# Patient Record
Sex: MALE | Race: WHITE | NOT HISPANIC OR LATINO | Employment: FULL TIME | URBAN - METROPOLITAN AREA
[De-identification: names, ages, dates, MRNs, and addresses within clinical notes are randomized per-mention and may not be internally consistent; named-entity substitution may affect disease eponyms.]

---

## 2020-01-15 DIAGNOSIS — Z01.810 PRE-OPERATIVE CARDIOVASCULAR EXAMINATION: ICD-10-CM

## 2020-01-15 PROCEDURE — 93010 ELECTROCARDIOGRAM REPORT: CPT | Performed by: INTERNAL MEDICINE

## 2020-01-15 PROCEDURE — 93005 ELECTROCARDIOGRAM TRACING: CPT

## 2020-01-15 RX ORDER — CITALOPRAM 40 MG/1
40 TABLET ORAL DAILY
COMMUNITY

## 2020-01-15 RX ORDER — ATORVASTATIN CALCIUM 40 MG/1
40 TABLET, FILM COATED ORAL DAILY
COMMUNITY

## 2020-01-16 ENCOUNTER — ANESTHESIA (OUTPATIENT)
Dept: SURGERY | Facility: MEDICAL CENTER | Age: 65
End: 2020-01-16
Payer: COMMERCIAL

## 2020-01-16 ENCOUNTER — ANESTHESIA EVENT (OUTPATIENT)
Dept: SURGERY | Facility: MEDICAL CENTER | Age: 65
End: 2020-01-16
Payer: COMMERCIAL

## 2020-01-16 ENCOUNTER — HOSPITAL ENCOUNTER (OUTPATIENT)
Facility: MEDICAL CENTER | Age: 65
End: 2020-01-16
Attending: SPECIALIST | Admitting: SPECIALIST
Payer: COMMERCIAL

## 2020-01-16 VITALS
WEIGHT: 145.5 LBS | HEIGHT: 68 IN | SYSTOLIC BLOOD PRESSURE: 146 MMHG | TEMPERATURE: 97 F | RESPIRATION RATE: 14 BRPM | OXYGEN SATURATION: 95 % | DIASTOLIC BLOOD PRESSURE: 85 MMHG | HEART RATE: 85 BPM | BODY MASS INDEX: 22.05 KG/M2

## 2020-01-16 LAB — EKG IMPRESSION: NORMAL

## 2020-01-16 PROCEDURE — C1713 ANCHOR/SCREW BN/BN,TIS/BN: HCPCS | Performed by: SPECIALIST

## 2020-01-16 PROCEDURE — 700111 HCHG RX REV CODE 636 W/ 250 OVERRIDE (IP): Performed by: ANESTHESIOLOGY

## 2020-01-16 PROCEDURE — A9270 NON-COVERED ITEM OR SERVICE: HCPCS | Performed by: ANESTHESIOLOGY

## 2020-01-16 PROCEDURE — 160025 RECOVERY II MINUTES (STATS): Performed by: SPECIALIST

## 2020-01-16 PROCEDURE — 160009 HCHG ANES TIME/MIN: Performed by: SPECIALIST

## 2020-01-16 PROCEDURE — 700102 HCHG RX REV CODE 250 W/ 637 OVERRIDE(OP): Performed by: ANESTHESIOLOGY

## 2020-01-16 PROCEDURE — 700101 HCHG RX REV CODE 250: Performed by: ANESTHESIOLOGY

## 2020-01-16 PROCEDURE — 501838 HCHG SUTURE GENERAL: Performed by: SPECIALIST

## 2020-01-16 PROCEDURE — 160036 HCHG PACU - EA ADDL 30 MINS PHASE I: Performed by: SPECIALIST

## 2020-01-16 PROCEDURE — 160002 HCHG RECOVERY MINUTES (STAT): Performed by: SPECIALIST

## 2020-01-16 PROCEDURE — A6223 GAUZE >16<=48 NO W/SAL W/O B: HCPCS | Performed by: SPECIALIST

## 2020-01-16 PROCEDURE — 700105 HCHG RX REV CODE 258: Performed by: SPECIALIST

## 2020-01-16 PROCEDURE — 700111 HCHG RX REV CODE 636 W/ 250 OVERRIDE (IP): Performed by: SPECIALIST

## 2020-01-16 PROCEDURE — 501445 HCHG STAPLER, SKIN DISP: Performed by: SPECIALIST

## 2020-01-16 PROCEDURE — 500331 HCHG COTTONOID, SURG PATTIE: Performed by: SPECIALIST

## 2020-01-16 PROCEDURE — 160046 HCHG PACU - 1ST 60 MINS PHASE II: Performed by: SPECIALIST

## 2020-01-16 PROCEDURE — 700101 HCHG RX REV CODE 250: Performed by: SPECIALIST

## 2020-01-16 PROCEDURE — 160029 HCHG SURGERY MINUTES - 1ST 30 MINS LEVEL 4: Performed by: SPECIALIST

## 2020-01-16 PROCEDURE — 160048 HCHG OR STATISTICAL LEVEL 1-5: Performed by: SPECIALIST

## 2020-01-16 PROCEDURE — 160035 HCHG PACU - 1ST 60 MINS PHASE I: Performed by: SPECIALIST

## 2020-01-16 PROCEDURE — 160041 HCHG SURGERY MINUTES - EA ADDL 1 MIN LEVEL 4: Performed by: SPECIALIST

## 2020-01-16 DEVICE — IMPLANTABLE DEVICE: Type: IMPLANTABLE DEVICE | Status: FUNCTIONAL

## 2020-01-16 DEVICE — SCREW UFS 1.2X4MM ST - (2TX25=50): Type: IMPLANTABLE DEVICE | Status: FUNCTIONAL

## 2020-01-16 RX ORDER — BALANCED SALT SOLUTION 6.4; .75; .48; .3; 3.9; 1.7 MG/ML; MG/ML; MG/ML; MG/ML; MG/ML; MG/ML
SOLUTION OPHTHALMIC
Status: DISCONTINUED
Start: 2020-01-16 | End: 2020-01-16 | Stop reason: HOSPADM

## 2020-01-16 RX ORDER — HYDROMORPHONE HYDROCHLORIDE 1 MG/ML
0.4 INJECTION, SOLUTION INTRAMUSCULAR; INTRAVENOUS; SUBCUTANEOUS
Status: DISCONTINUED | OUTPATIENT
Start: 2020-01-16 | End: 2020-01-16 | Stop reason: HOSPADM

## 2020-01-16 RX ORDER — EPINEPHRINE 1 MG/ML(1)
AMPUL (ML) INJECTION
Status: DISCONTINUED
Start: 2020-01-16 | End: 2020-01-16 | Stop reason: HOSPADM

## 2020-01-16 RX ORDER — LIDOCAINE HYDROCHLORIDE AND EPINEPHRINE 10; 10 MG/ML; UG/ML
INJECTION, SOLUTION INFILTRATION; PERINEURAL
Status: DISCONTINUED
Start: 2020-01-16 | End: 2020-01-16 | Stop reason: HOSPADM

## 2020-01-16 RX ORDER — BALANCED SALT SOLUTION 6.4; .75; .48; .3; 3.9; 1.7 MG/ML; MG/ML; MG/ML; MG/ML; MG/ML; MG/ML
SOLUTION OPHTHALMIC
Status: DISCONTINUED | OUTPATIENT
Start: 2020-01-16 | End: 2020-01-16 | Stop reason: HOSPADM

## 2020-01-16 RX ORDER — DIPHENHYDRAMINE HYDROCHLORIDE 50 MG/ML
12.5 INJECTION INTRAMUSCULAR; INTRAVENOUS
Status: DISCONTINUED | OUTPATIENT
Start: 2020-01-16 | End: 2020-01-16 | Stop reason: HOSPADM

## 2020-01-16 RX ORDER — BUPIVACAINE HYDROCHLORIDE AND EPINEPHRINE 5; 5 MG/ML; UG/ML
INJECTION, SOLUTION EPIDURAL; INTRACAUDAL; PERINEURAL
Status: DISCONTINUED
Start: 2020-01-16 | End: 2020-01-16 | Stop reason: HOSPADM

## 2020-01-16 RX ORDER — BUPIVACAINE HYDROCHLORIDE AND EPINEPHRINE 5; 5 MG/ML; UG/ML
INJECTION, SOLUTION EPIDURAL; INTRACAUDAL; PERINEURAL
Status: DISCONTINUED | OUTPATIENT
Start: 2020-01-16 | End: 2020-01-16 | Stop reason: HOSPADM

## 2020-01-16 RX ORDER — HYDROMORPHONE HYDROCHLORIDE 1 MG/ML
0.2 INJECTION, SOLUTION INTRAMUSCULAR; INTRAVENOUS; SUBCUTANEOUS
Status: DISCONTINUED | OUTPATIENT
Start: 2020-01-16 | End: 2020-01-16 | Stop reason: HOSPADM

## 2020-01-16 RX ORDER — LIDOCAINE HYDROCHLORIDE AND EPINEPHRINE 10; 10 MG/ML; UG/ML
INJECTION, SOLUTION INFILTRATION; PERINEURAL
Status: DISCONTINUED | OUTPATIENT
Start: 2020-01-16 | End: 2020-01-16 | Stop reason: HOSPADM

## 2020-01-16 RX ORDER — METHYLPREDNISOLONE ACETATE 80 MG/ML
INJECTION, SUSPENSION INTRA-ARTICULAR; INTRALESIONAL; INTRAMUSCULAR; SOFT TISSUE
Status: DISCONTINUED
Start: 2020-01-16 | End: 2020-01-16 | Stop reason: HOSPADM

## 2020-01-16 RX ORDER — DEXAMETHASONE SODIUM PHOSPHATE 4 MG/ML
INJECTION, SOLUTION INTRA-ARTICULAR; INTRALESIONAL; INTRAMUSCULAR; INTRAVENOUS; SOFT TISSUE PRN
Status: DISCONTINUED | OUTPATIENT
Start: 2020-01-16 | End: 2020-01-16 | Stop reason: SURG

## 2020-01-16 RX ORDER — HALOPERIDOL 5 MG/ML
1 INJECTION INTRAMUSCULAR
Status: DISCONTINUED | OUTPATIENT
Start: 2020-01-16 | End: 2020-01-16 | Stop reason: HOSPADM

## 2020-01-16 RX ORDER — ONDANSETRON 2 MG/ML
4 INJECTION INTRAMUSCULAR; INTRAVENOUS
Status: DISCONTINUED | OUTPATIENT
Start: 2020-01-16 | End: 2020-01-16 | Stop reason: HOSPADM

## 2020-01-16 RX ORDER — EPINEPHRINE 1 MG/ML
INJECTION INTRAMUSCULAR; INTRAVENOUS; SUBCUTANEOUS
Status: DISCONTINUED
Start: 2020-01-16 | End: 2020-01-16 | Stop reason: HOSPADM

## 2020-01-16 RX ORDER — CEFAZOLIN SODIUM 1 G/3ML
INJECTION, POWDER, FOR SOLUTION INTRAMUSCULAR; INTRAVENOUS PRN
Status: DISCONTINUED | OUTPATIENT
Start: 2020-01-16 | End: 2020-01-16 | Stop reason: SURG

## 2020-01-16 RX ORDER — SODIUM CHLORIDE, SODIUM LACTATE, POTASSIUM CHLORIDE, CALCIUM CHLORIDE 600; 310; 30; 20 MG/100ML; MG/100ML; MG/100ML; MG/100ML
INJECTION, SOLUTION INTRAVENOUS CONTINUOUS
Status: DISCONTINUED | OUTPATIENT
Start: 2020-01-16 | End: 2020-01-16 | Stop reason: HOSPADM

## 2020-01-16 RX ORDER — OXYCODONE HYDROCHLORIDE AND ACETAMINOPHEN 5; 325 MG/1; MG/1
1 TABLET ORAL
Status: COMPLETED | OUTPATIENT
Start: 2020-01-16 | End: 2020-01-16

## 2020-01-16 RX ORDER — EPINEPHRINE 1 MG/ML(1)
AMPUL (ML) INJECTION
Status: DISCONTINUED | OUTPATIENT
Start: 2020-01-16 | End: 2020-01-16 | Stop reason: HOSPADM

## 2020-01-16 RX ORDER — OXYCODONE HYDROCHLORIDE AND ACETAMINOPHEN 5; 325 MG/1; MG/1
2 TABLET ORAL
Status: COMPLETED | OUTPATIENT
Start: 2020-01-16 | End: 2020-01-16

## 2020-01-16 RX ORDER — ONDANSETRON 2 MG/ML
INJECTION INTRAMUSCULAR; INTRAVENOUS PRN
Status: DISCONTINUED | OUTPATIENT
Start: 2020-01-16 | End: 2020-01-16 | Stop reason: SURG

## 2020-01-16 RX ORDER — HYDROMORPHONE HYDROCHLORIDE 1 MG/ML
0.1 INJECTION, SOLUTION INTRAMUSCULAR; INTRAVENOUS; SUBCUTANEOUS
Status: DISCONTINUED | OUTPATIENT
Start: 2020-01-16 | End: 2020-01-16 | Stop reason: HOSPADM

## 2020-01-16 RX ADMIN — OXYCODONE HYDROCHLORIDE AND ACETAMINOPHEN 2 TABLET: 5; 325 TABLET ORAL at 09:56

## 2020-01-16 RX ADMIN — DEXAMETHASONE SODIUM PHOSPHATE 8 MG: 4 INJECTION, SOLUTION INTRA-ARTICULAR; INTRALESIONAL; INTRAMUSCULAR; INTRAVENOUS; SOFT TISSUE at 07:50

## 2020-01-16 RX ADMIN — EPHEDRINE SULFATE 10 MG: 50 INJECTION INTRAMUSCULAR; INTRAVENOUS; SUBCUTANEOUS at 07:46

## 2020-01-16 RX ADMIN — FENTANYL CITRATE 25 MCG: 0.05 INJECTION, SOLUTION INTRAMUSCULAR; INTRAVENOUS at 11:00

## 2020-01-16 RX ADMIN — SODIUM CHLORIDE, POTASSIUM CHLORIDE, SODIUM LACTATE AND CALCIUM CHLORIDE: 600; 310; 30; 20 INJECTION, SOLUTION INTRAVENOUS at 07:00

## 2020-01-16 RX ADMIN — SUGAMMADEX 200 MG: 100 INJECTION, SOLUTION INTRAVENOUS at 09:06

## 2020-01-16 RX ADMIN — CEFAZOLIN 2 G: 330 INJECTION, POWDER, FOR SOLUTION INTRAMUSCULAR; INTRAVENOUS at 07:33

## 2020-01-16 RX ADMIN — ROCURONIUM BROMIDE 40 MG: 10 INJECTION, SOLUTION INTRAVENOUS at 07:43

## 2020-01-16 RX ADMIN — FENTANYL CITRATE 150 MCG: 50 INJECTION, SOLUTION INTRAMUSCULAR; INTRAVENOUS at 07:42

## 2020-01-16 RX ADMIN — FENTANYL CITRATE 100 MCG: 50 INJECTION, SOLUTION INTRAMUSCULAR; INTRAVENOUS at 07:49

## 2020-01-16 RX ADMIN — ONDANSETRON 4 MG: 2 INJECTION INTRAMUSCULAR; INTRAVENOUS at 07:50

## 2020-01-16 RX ADMIN — MIDAZOLAM HYDROCHLORIDE 2 MG: 1 INJECTION, SOLUTION INTRAMUSCULAR; INTRAVENOUS at 07:43

## 2020-01-16 RX ADMIN — PROPOFOL 200 MG: 10 INJECTION, EMULSION INTRAVENOUS at 07:43

## 2020-01-16 RX ADMIN — EPHEDRINE SULFATE 10 MG: 50 INJECTION INTRAMUSCULAR; INTRAVENOUS; SUBCUTANEOUS at 07:48

## 2020-01-16 ASSESSMENT — PAIN SCALES - GENERAL: PAIN_LEVEL: 2

## 2020-01-16 NOTE — OR SURGEON
Immediate Post OP Note    PreOp Diagnosis: right orbital complwx fracture, rim, floor, maxillary lateral butress.    PostOp Diagnosis: same    Procedure(s):  ORIF, FRACTURE, ORBIT - Wound Class: Clean    Surgeon(s):  Saige Swenson M.D.    Anesthesiologist/Type of Anesthesia:  Anesthesiologist: Kemar Whitaker M.D./General    Surgical Staff:  Circulator: Racheal Cho R.N.  Relief Circulator: Gerda Guzman R.N.  Relief Scrub: Ovisami Acevedoe  Scrub Person: Jennyfer Kasper    Specimens removed if any:  * No specimens in log *    Estimated Blood Loss: minimal     Findings: displaced right orbital complex fracture with infra-orbital nerve impingement    Complications: none        1/16/2020 9:27 AM Saige Swenson M.D.

## 2020-01-16 NOTE — OP REPORT
DATE OF SERVICE:  01/16/2020    PREOPERATIVE DIAGNOSES:  1.  Right orbital complex fracture, comminuted and displaced.  2.  Second division trigeminal nerve impaction with the hypesthesia.    POSTOPERATIVE DIAGNOSES:  1.  Right orbital complex fracture, comminuted and displaced.  2.  Second division trigeminal nerve impaction with the hypesthesia.    OPERATION PERFORMED:  Open reduction and internal fixation of right orbital   complex fracture with multiple approaches, transorbital, right canine fossa.    ANESTHESIA:  General endotracheal.    ANESTHESIOLOGIST:  Kemar Whitaker MD    SURGEON:  Saige Swenson MD    ASSISTANT:  Second scrub nurse.    LOCAL ADJUNCTS:  Equal amounts of 0.5% Marcaine and 1% lidocaine, 1:95,000   epinephrine, total amount 10 mL injected subcutaneously and submucosally   preoperatively.    INDICATIONS:  This 64-year-old male incurred a slip and fall accident in   [place] on Christmas Eve, 12/24/2019.  He was imaged and   evaluated.  We saw him this week, 01/13/2020.  Reviewing his imaging, he had   an orbital rim fracture with impaction of the infraorbital foramen and some   displacement of the lateral maxillary buttress.  His occlusion was good.  His   vision was good.  He had no restricted extraocular muscle movement.    Patient did understand the risks and possible complications of this procedure   including bleeding, infection, visual disturbance, and failure to be able to   mobilize his fracture after an extended period of time.  He understood and   agreed.  He wished to proceed.    DESCRIPTION OF PROCEDURE:  The patient was taken to the operating room #26 at   the [facility].  Dr. Whitaker performed general endotracheal   anesthesia without complication.  The table was turned 140 degrees   counterclockwise.  A preoperative time-out was successfully conducted.  The   areas in question were marked and localized.  Patient was prepped and draped   in  the usual sterile fashion.  An incision was made in the relaxed skin   tension line of the right infraorbital area.  Periosteum was incised.  The   periosteum was reflected inferiorly and along the orbital floor.  There was an   orbital floor fracture, but it was nondisplaced.  There was no defect.  The   fracture line was identified.  The infraorbital foramen was impacted.  There   was a displaced fragment above the orbital rim medially.  A canine fossa   incision was made.  Bluntly and sharply, we dissected under the malar   eminence.  The infraorbital nerve was identified.  It was preserved.    A small Hall elevator was then utilized to place under the malar eminence.    's attention was then placed above and a tenaculum was placed on the   lateral malar eminence.  The fracture line was firm.  A 3-mm osteotome was   used to freshen the fracture site and the orbital rim and orbital floor area.    We were then able to mobilize the lateral displaced fragment superiorly and   somewhat laterally and slightly anteriorly.  The segments of comminuted   fracture fell into place.  A straight plate, 24-hole was condensed to 12   holes.  It was a 0.5 mm profile.  It was a [place] plate.  We drilled medial   holes and attached to the plate.  We then rotated the plate in to good   position.  We manually continued excellent reduction.  We used self-tapping   screws, seven total of the 1.2x3 and 4 mm.  Excellent reduction was achieved.    Wounds were irrigated with normal saline.    The canine fossa incision was reevaluated.  The maxilla was noted.  The   lateral buttress was fractured.  We were able to reduce the fragmented   buttress.  This seemed to impact nicely and was in good position.  We did not   feel fixation was necessary.  The maxillary sinus was sucked free of old   blood.    Wounds were irrigated with normal saline.  The canine fossa wound was closed   with 3-0 chromic on a GI needle.    The orbital wound  was irrigated again.  The periosteum was closed over the   plate with a series of 5-0 Vicryl interrupted sutures.  A subcuticular 5-0   Prolene was then used for closure.  Two Steri-Strips were used for the skin as   well.    The procedure was terminated.  The patient was returned to anesthesia.  Sharps   and sponge counts were reported as correct.    ESTIMATED BLOOD LOSS:  Minimal.    DISPOSITION:  Patient awakened in the operating room, extubated, and taken to   recovery room in stable condition.       ____________________________________     MD THOMAS MORENO / LUIS E    DD:  01/16/2020 09:20:32  DT:  01/16/2020 09:53:52    D#:  [id number]  Job#:  [id number]    cc: LAURA Camarillo MD

## 2020-01-16 NOTE — ANESTHESIA TIME REPORT
Anesthesia Start and Stop Event Times     Date Time Event    1/16/2020 0726 Ready for Procedure     0733 Anesthesia Start     0912 Anesthesia Stop        Responsible Staff  01/16/20    Name Role Begin End    Kemar Whitaker M.D. Anesth 0733 0912        Preop Diagnosis (Free Text):  Pre-op Diagnosis     RIGHT ORBITAL FRACTURE        Preop Diagnosis (Codes):    Post op Diagnosis  Orbital fracture      Premium Reason  Non-Premium    Comments:

## 2020-01-16 NOTE — H&P
DATE OF ADMISSION:  01/16/2020    HISTORY OF PRESENT ILLNESS:  This 64-year-old  at [facility]   in [place] was in the restaurant on [address] when he stumbled and   struck his face against the floor.  He had swelling of the right eye area.  He   had some tenderness.  He was evaluated.  A CAT scan of the maxillofacial area   was done.  This was reported as an anterior and lateral maxillary sinus   fracture.  It was felt he should be seen by his [employer] managers.  Dr. NICKI Camarillo   asked me to see him as soon as possible.  We did see him on 01/10/2020.  We   did review his CT and found he had an orbital rim fracture, which was   comminuted and involving the infraorbital foramen.  He had a slightly   displaced right lateral maxillary sinus fracture as well.    EMPLOYMENT:  .    HABITS:  Tobacco, none.  Alcohol, occasional drink.    CURRENT MEDICATIONS:  Celexa and Wellbutrin as well as a statin.    SERIOUS MEDICAL PROBLEMS:  Ruptured biceps tendon recently on the right side.    No repair is scheduled.  He has suffered from depression for which he takes   medication.  He has hypercholesterolemia, treated.  He has osteoarthritis.    PAST SURGICAL HISTORY:  None.    REVIEW OF SYSTEMS:  Negative.    PHYSICAL EXAMINATION:  VITAL SIGNS:  Height 5 feet 8 inches, weight 140 pounds.  GENERAL:  Patient is very well spoken and well groomed.  HEENT:  The cranium was normal.  Ear canals and drums normal.  Nose was   negative.  Oral cavity clear.  Occlusion good.  Facial skeleton revealed a   step involving the right infraorbital rim as well as some hypesthesia of the   second division trigeminal nerve.  His extraocular muscle movements were very   good.  His visual acuity was good.  There was perhaps a bit of right malar   asymmetry, but very little.  CHEST:  Clear.  HEART:  Tones normal.  Peripheral perfusion good.  ABDOMEN:  Grossly normal.  GENITOURINARY AND RECTAL:  Not done.  EXTREMITIES AND  NEUROLOGICAL:  Negative with the exception of the right   infraorbital nerve hypesthesia.    DIAGNOSES:  1. Right orbital rim fracture, comminuted and slightly displaced.  2. Right lateral maxillary sinus fracture, slightly displaced.  3. Hypesthesia right second division trigeminal nerve, possibly from   impingement.    RECOMMENDATIONS:  Patient was given spectrum of possible treatments.  He has   very little asymmetry and was told he could forego any surgical manipulation.    It was felt that because of the numbness of his face and the step involving   the right infraorbital rim and infraorbital foramen area that he could benefit   from exploration of his fracture with reduction, possible fixation in an   effort to make sure that his infraorbital nerve was free.    Patient opted for exploration.  We will proceed.  He understood the risks and   complications including bleeding, infection, scar involving the right   infraorbital rim area.  He has scar under the right upper lip.  Additional   numbness of the right facial area for an indeterminate period of time.  He   understood and wished to proceed.             ____________________________________     MD THOMAS MORENO / LUIS E    DD:  01/15/2020 17:35:59  DT:  01/15/2020 20:14:43    D#:  [id number]  Job#:  [id number]    cc: NICKI Camarillo MD

## 2020-01-16 NOTE — ANESTHESIA PREPROCEDURE EVALUATION
Relevant Problems   No relevant active problems       Physical Exam    Airway   Mallampati: I  TM distance: >3 FB       Cardiovascular - normal exam  Rhythm: regular  Rate: normal     Dental - normal exam         Pulmonary   Breath sounds clear to auscultation     Abdominal - normal exam     Neurological - normal exam                 Anesthesia Plan    ASA 2       Plan - general       Airway plan will be ETT      Plan Factors:   Patient was not previously instructed to abstain from smoking on day of procedure.  Patient did not smoke on day of procedure.      Induction: intravenous    Postoperative Plan: Postoperative administration of opioids is intended.        Informed Consent:    Anesthetic plan and risks discussed with patient.

## 2020-01-16 NOTE — OR NURSING
0910- Pt to PACU from OR. Report from anesthesia and OR RN. ET tube with 3L O2 via blow-by. Respirations even and unlabored. VSS. Steri strips in place under Right eye. CDI.     0933- airway dc'd    0935- MD at bedside  - no blowing nose, sniff is ok (2 weeks)  - may remove steri-strips tomorrow  - cold compress no ice 48 hours  - follow-up next week on Wednesday, call to schedule  - head elevated for a couple of days    0956- pt tolerating PO fluids, medicated with oral pain medication for 5/10 pain.    1039- pt meet criteria for phase 2    1100-Pt medicated for pain    1133-Discharge orders received. Meets discharge criteria at this time. Tolerating pain. No nausea. Tolerating PO. On RA. All lines and monitors discontinued. Reviewed discharge paperwork with spouse. Discussed diet, activity, medications, follow up care and worsening symptoms. No questions at this time. Pt to be discharged to home via private vehicle.

## 2020-01-16 NOTE — ANESTHESIA POSTPROCEDURE EVALUATION
Patient: Abdi Marion    Procedure Summary     Date:  01/16/20 Room / Location:  [facility] ROOM 26 / SURGERY SAME DAY [facility]    Anesthesia Start:  0733 Anesthesia Stop:  0912    Procedure:  ORIF, FRACTURE, ORBIT (Right Face) Diagnosis:  (RIGHT ORBITAL FRACTURE)    Surgeon:  Saige Swenson M.D. Responsible Provider:  Kemar Whitaker M.D.    Anesthesia Type:  general ASA Status:  2          Final Anesthesia Type: general  Last vitals  BP   Blood Pressure: 123/63, NIBP: 145/80    Temp   36.5 °C (97.7 °F)    Pulse   Pulse: 70   Resp   16    SpO2   100 %      Anesthesia Post Evaluation    Patient location during evaluation: PACU  Patient participation: complete - patient participated  Level of consciousness: awake  Pain score: 2    Airway patency: patent  Anesthetic complications: no  Cardiovascular status: adequate  Respiratory status: acceptable  Hydration status: acceptable    PONV: none

## 2020-01-16 NOTE — H&P
He is coming in [facility] tomorrow morning 7:30 a.m.    HISTORY OF PRESENT ILLNESS:  This 64-year-old  at [facility]   in [place] was in the restaurant on [place] when he stumbled and   struck his face against the floor.  He had swelling of the right eye area.  He   had some tenderness.  He was evaluated.  A CAT scan of the maxillofacial area   was done.  This was reported as an anterior and lateral maxillary sinus   fracture.  It was felt he should be seen by his ____ managers.  Dr. LAURA Camarillo   asked me to see him as soon as possible.  We did see him on 01/10/2020.  We   did review his CT and found he had an orbital rim fracture, which was   comminuted and involving the infraorbital foramen.  He had a slightly   displaced right lateral maxillary sinus fracture as well.    EMPLOYMENT:  .    HABITS:  Tobacco, none.  Alcohol, occasional drink.    CURRENT MEDICATIONS:  Celexa and Wellbutrin as well as a statin.    SERIOUS MEDICAL PROBLEMS:  Ruptured biceps tendon recently on the right side.    No repair is scheduled.  He has suffered from depression for which he takes   medication.  He has hypercholesterolemia, treated.  He has osteoarthritis.    PAST SURGICAL HISTORY:  None.    REVIEW OF SYSTEMS:  Negative.    PHYSICAL EXAMINATION:  VITAL SIGNS:  Height 5 feet 8 inches, weight 140 pounds.  GENERAL:  Patient is very well spoken and well groomed.  HEENT:  The cranium was normal.  Ear canals and drums normal.  Nose was   negative.  Oral cavity clear.  Occlusion good.  Facial skeleton revealed a   step involving the right infraorbital rim as well as some hypesthesia of the   second division trigeminal nerve.  His extraocular muscle movements were very   good.  His visual acuity was good.  There was perhaps a bit of right malar   asymmetry, but very little.  CHEST:  Clear.  HEART:  Tones normal.  Peripheral perfusion good.  ABDOMEN:  Grossly normal.  GENITOURINARY AND RECTAL:  Not  done.  EXTREMITIES AND NEUROLOGICAL:  Negative with the exception of the right   infraorbital nerve hypesthesia.    DIAGNOSES:  1.  Right orbital rim fracture, comminuted and slightly displaced.  2.  Right lateral maxillary sinus fracture, slightly displaced.  3.  Hypesthesia right second division trigeminal nerve, possibly from   impingement.    RECOMMENDATIONS:  Patient was given spectrum of possible treatments.  He has   very little asymmetry and was told he could forego any surgical manipulation.    It was felt that because of the numbness of his face and the step involving   the right infraorbital rim and infraorbital foramen area that he could benefit   from exploration of his fracture with reduction, possible fixation in an   effort to make sure that his infraorbital nerve was free.    Patient opted for exploration.  We will proceed.  He understood the risks and   complications including bleeding, infection, scar involving the right   infraorbital rim area.  He has scar under the right upper lip.  Additional   numbness of the right facial area for an indeterminate period of time.  He   understood and wished to proceed.       ____________________________________     MD THOAMS MORENO / LUIS E    DD:  01/15/2020 17:35:59  DT:  01/15/2020 20:14:43    D#:  [id number]  Job#:  [id number]    cc: Alonso Camarillo MD

## 2020-01-16 NOTE — ANESTHESIA POSTPROCEDURE EVALUATION
Patient: Abdi Marion    Procedure Summary     Date:  01/16/20 Room / Location:  [facility] ROOM 26 / SURGERY SAME DAY [facility]    Anesthesia Start:  0733 Anesthesia Stop:  0912    Procedure:  ORIF, FRACTURE, ORBIT (Right Face) Diagnosis:  (RIGHT ORBITAL FRACTURE)    Surgeon:  Saige Swenson M.D. Responsible Provider:  Kemar Whitaker M.D.    Anesthesia Type:  general ASA Status:  2          Final Anesthesia Type: general  Last vitals  BP   Blood Pressure: 123/63, NIBP: 145/80    Temp   36.5 °C (97.7 °F)    Pulse   Pulse: 70   Resp   16    SpO2   100 %      Anesthesia Post Evaluation    Patient location during evaluation: PACU  Patient participation: complete - patient cannot participate  Level of consciousness: awake  Pain score: 2    Airway patency: patent  Anesthetic complications: no  Cardiovascular status: adequate  Respiratory status: acceptable  Hydration status: acceptable    PONV: none

## 2020-01-16 NOTE — ANESTHESIA QCDR
2019 [facility] Clinical Data Registry (for Quality Improvement)     Postoperative nausea/vomiting risk protocol (Adult = 18 yrs and Pediatric 3-17 yrs)- (430 and 463)  General inhalation anesthetic (NOT TIVA) with PONV risk factors: Yes  Provision of anti-emetic therapy with at least 2 different classes of agents: Yes   Patient DID NOT receive anti-emetic therapy and reason is documented in Medical Record:  N/A    Multimodal Pain Management- (477)  Non-emergent surgery AND patient age >= 18: Yes  Use of Multimodal Pain Management, two or more drugs and/or interventions, NOT including systemic opioids: No  Exception: Documented allergy to multiple classes of analgesics:        Smoking Abstinence (404)  Patient is current smoker (cigarette, pipe, e-cig, marijuanna): No  Elective Surgery:   Abstinence instructions provided prior to day of surgery:   Patient abstained from smoking on day of surgery:     Pre-Op Beta-Blocker in Isolated CABG (44)  Isolated CABG AND patient age >= 18: No  Beta-blocker admin within 24 hours of surgical incision:   Exception:of medical reason(s) for not administering beta blocker within 24 hours prior to surgical incision (e.g., not  indicated,other medical reason):     PACU assessment of acute postoperative pain prior to Anesthesia Care End- Applies to Patients Age = 18- (ABG7)  Initial PACU pain score is which of the following: < 7/10  Patient unable to report pain score: N/A    Post-anesthetic transfer of care checklist/protocol to PACU/ICU- (426 and 427)  Upon conclusion of case, patient transferred to which of the following locations: PACU/Non-ICU  Use of transfer checklist/protocol: Yes  Exclusion: Service Performed in Patient Hospital Room (and thus did not require transfer): N/A  Unplanned admission to ICU related to anesthesia service up through end of PACU care- (MD51)  Unplanned admission to ICU (not initially anticipated at anesthesia start time): No

## 2020-01-16 NOTE — DISCHARGE INSTRUCTIONS
ACTIVITY: Rest and take it easy for the first 24 hours.  A responsible adult is recommended to remain with you during that time.  It is normal to feel sleepy.  We encourage you to not do anything that requires balance, judgment or coordination.    MILD FLU-LIKE SYMPTOMS ARE NORMAL. YOU MAY EXPERIENCE GENERALIZED MUSCLE ACHES, THROAT IRRITATION, HEADACHE AND/OR SOME NAUSEA.    FOR 24 HOURS DO NOT:  Drive, operate machinery or run household appliances.  Drink beer or alcoholic beverages.   Make important decisions or sign legal documents.    SPECIAL INSTRUCTIONS:   - no blowing nose, sniff is ok (2 weeks)  - may remove steri-strips tomorrow  - cold compress (no ice) 48 hours  - follow-up next week on Wednesday, call to schedule  - head elevated for a couple of days      DIET: To avoid nausea, slowly advance diet as tolerated, avoiding spicy or greasy foods for the first day.  Add more substantial food to your diet according to your physician's instructions.  Babies can be fed formula or breast milk as soon as they are hungry.  INCREASE FLUIDS AND FIBER TO AVOID CONSTIPATION.    SURGICAL DRESSING/BATHING: Keep eye area clean and dry    FOLLOW-UP APPOINTMENT:  A follow-up appointment should be arranged with your doctor Next Wednesday; call to schedule.    You should CALL YOUR PHYSICIAN if you develop:  Fever greater than 101 degrees F.  Pain not relieved by medication, or persistent nausea or vomiting.  Excessive bleeding (blood soaking through dressing) or unexpected drainage from the wound.  Extreme redness or swelling around the incision site, drainage of pus or foul smelling drainage.  Inability to urinate or empty your bladder within 8 hours.  Problems with breathing or chest pain.    You should call 911 if you develop problems with breathing or chest pain.  If you are unable to contact your doctor or surgical center, you should go to the nearest emergency room or urgent care center.  Physician's telephone #:   [place] [phone]    If any questions arise, call your doctor.  If your doctor is not available, please feel free to call the Surgical Center at [phone].  The Center is open Monday through Friday from 7AM to 7PM.  You can also call the HEALTH HOTLINE open 24 hours/day, 7 days/week and speak to a nurse at [phone], or toll free at [phone].    A registered nurse may call you a few days after your surgery to see how you are doing after your procedure.    MEDICATIONS: Resume taking daily medication.  Take prescribed pain medication with food.  If no medication is prescribed, you may take non-aspirin pain medication if needed.  PAIN MEDICATION CAN BE VERY CONSTIPATING.  Take a stool softener or laxative such as senokot, pericolace, or milk of magnesia if needed.    Prescription given for Oxycodone.  Last pain medication given at Percocet 10mg at 10:00 am. Next available at 2:00pm.    If your physician has prescribed pain medication that includes Acetaminophen (Tylenol), do not take additional Acetaminophen (Tylenol) while taking the prescribed medication.    Depression / Suicide Risk    As you are discharged from this [facility] facility, it is important to learn how to keep safe from harming yourself.    Recognize the warning signs:  · Abrupt changes in personality, positive or negative- including increase in energy   · Giving away possessions  · Change in eating patterns- significant weight changes-  positive or negative  · Change in sleeping patterns- unable to sleep or sleeping all the time   · Unwillingness or inability to communicate  · Depression  · Unusual sadness, discouragement and loneliness  · Talk of wanting to die  · Neglect of personal appearance   · Rebelliousness- reckless behavior  · Withdrawal from people/activities they love  · Confusion- inability to concentrate     If you or a loved one observes any of these behaviors or has concerns about self-harm, here's what you can  do:  · Talk about it- your feelings and reasons for harming yourself  · Remove any means that you might use to hurt yourself (examples: pills, rope, extension cords, firearm)  · Get professional help from the community (Mental Health, Substance Abuse, psychological counseling)  · Do not be alone:Call your Safe Contact- someone whom you trust who will be there for you.  · Call your local CRISIS HOTLINE 017-8812 or 192-980-0082  · Call your local Children's Mobile Crisis Response Team [place] (804) 845-8066 or www.WeVue  · Call the toll free National Suicide Prevention Hotlines   · National Suicide Prevention Lifeline 873-227-LKSI (6064)  · National Hope Line Network 800-SUICIDE (194-4267)

## 2020-01-16 NOTE — ANESTHESIA PROCEDURE NOTES
Airway  Date/Time: 1/16/2020 7:40 AM  Performed by: Kemar Whitaker M.D.  Authorized by: Kemar Whitaker M.D.     Location:  OR  Urgency:  Elective  Difficult Airway: No    Indications for Airway Management:  Anesthesia  Spontaneous Ventilation: present    Sedation Level:  Deep  Preoxygenated: Yes    Patient Position:  Sniffing  MILS Maintained Throughout: No    Mask Difficulty Assessment:  0 - not attempted  Final Airway Type:  Endotracheal airway  Final Endotracheal Airway:  ETT  Cuffed: Yes    Technique Used for Successful ETT Placement:  Direct laryngoscopy  Blade Type:  Aston  Laryngoscope Blade/Videolaryngoscope Blade Size:  3  ETT Size (mm):  7.0  Placement Verified by: auscultation and capnometry    Cormack-Lehane Classification:  Grade I - full view of glottis  Number of Attempts at Approach:  1

## 2020-02-16 ENCOUNTER — HEALTH MAINTENANCE LETTER (OUTPATIENT)
Age: 65
End: 2020-02-16

## (undated) DEVICE — SUTURE 5-0 VICRYL PLUS P-3 18 (36PK/BX)"

## (undated) DEVICE — DRAPE LARGE 3 QUARTER - (20/CA)

## (undated) DEVICE — ELECTRODE DUAL RETURN W/ CORD - (50/PK)

## (undated) DEVICE — IMPLANTABLE DEVICE: Type: IMPLANTABLE DEVICE | Status: NON-FUNCTIONAL

## (undated) DEVICE — SUCTION INSTRUMENT YANKAUER BULBOUS TIP W/O VENT (50EA/CA)

## (undated) DEVICE — LACTATED RINGERS INJ 1000 ML - (14EA/CA 60CA/PF)

## (undated) DEVICE — CATHETER IV 20 GA X 1-1/4 ---SURG.& SDS ONLY--- (50EA/BX)

## (undated) DEVICE — DRAPE SURGICAL U 77X120 - (10/CA)

## (undated) DEVICE — GLOVE SZ 7 BIOGEL PI MICRO - PF LF (50PR/BX 4BX/CA)

## (undated) DEVICE — PATTIES SURG X-RAYCOTTONOID - 1/2 X 3 IN (200/CA)

## (undated) DEVICE — Device

## (undated) DEVICE — SENSOR SPO2 NEO LNCS ADHESIVE (20/BX) SEE USER NOTES

## (undated) DEVICE — SLEEVE, VASO, THIGH, MED

## (undated) DEVICE — SYRINGE 10 ML CONTROL LL (25EA/BX 4BX/CA)

## (undated) DEVICE — SUTURE GENERAL

## (undated) DEVICE — PROTECTOR ULNA NERVE - (36PR/CA)

## (undated) DEVICE — GAUZE PETROLATUM 1/2 X 72 IN - (VASELINE) (12EA/BX 6BX/CA)

## (undated) DEVICE — HEAD HOLDER JUNIOR/ADULT

## (undated) DEVICE — STERI STRIP COMPOUND BENZOIN - TINCTURE 0.6ML WITH APPLICATOR (40EA/BX)

## (undated) DEVICE — GOWN WARMING STANDARD FLEX - (30/CA)

## (undated) DEVICE — TUBE E-T HI-LO CUFF 7.0MM (10EA/PK)

## (undated) DEVICE — CANISTER SUCTION 3000ML MECHANICAL FILTER AUTO SHUTOFF MEDI-VAC NONSTERILE LF DISP  (40EA/CA)

## (undated) DEVICE — TOWELS CLOTH SURGICAL - (4/PK 20PK/CA)

## (undated) DEVICE — MANIFOLD NEPTUNE 1 PORT (20/PK)

## (undated) DEVICE — SET LEADWIRE 5 LEAD BEDSIDE DISPOSABLE ECG (1SET OF 5/EA)

## (undated) DEVICE — GLOVE BIOGEL PI INDICATOR SZ 7.0 SURGICAL PF LF - (50/BX 4BX/CA)

## (undated) DEVICE — SUTURE 4-0 VICRYL PLUS FS-2 - 27 INCH (36/BX)

## (undated) DEVICE — BOVIE BLADE COATED - (50/PK)

## (undated) DEVICE — MASK ANESTHESIA ADULT  - (100/CA)

## (undated) DEVICE — TUBE CONNECTING SUCTION - CLEAR PLASTIC STERILE 72 IN (50EA/CA)

## (undated) DEVICE — CANISTER SUCTION RIGID RED 1500CC (40EA/CA)

## (undated) DEVICE — DISH PETRI STERILE (50EA/CA)

## (undated) DEVICE — TUBING CLEARLINK DUO-VENT - C-FLO (48EA/CA)

## (undated) DEVICE — SODIUM CHL IRRIGATION 0.9% 1000ML (12EA/CA)

## (undated) DEVICE — KIT ANESTHESIA W/CIRCUIT & 3/LT BAG W/FILTER (20EA/CA)

## (undated) DEVICE — GLOVE BIOGEL PI INDICATOR SZ 7.5 SURGICAL PF LF -(50/BX 4BX/CA)

## (undated) DEVICE — KIT  I.V. START (100EA/CA)

## (undated) DEVICE — BOVIE NEEDLE TIP INSULATD NON-SAFETY 2CM (50/PK)

## (undated) DEVICE — SUTURE 4-0 CHROMIC FS-2 (36EA/BX)

## (undated) DEVICE — CLOSURE SKIN STRIP 1/2 X 4 IN - (STERI STRIP) (50/BX 4BX/CA)

## (undated) DEVICE — STAPLER SKIN DISP - (6/BX 10BX/CA) VISISTAT

## (undated) DEVICE — ELECTRODE 850 FOAM ADHESIVE - HYDROGEL RADIOTRNSPRNT (50/PK)

## (undated) DEVICE — BLADE SURGICAL #15 - (50/BX 3BX/CA)

## (undated) DEVICE — CORDS BIPOLAR COAGULATION - 12FT STERILE DISP. (10EA/BX)

## (undated) DEVICE — PACK MINOR BASIN - (2EA/CA)

## (undated) DEVICE — GOWN SURGEONS LARGE - (32/CA)